# Patient Record
Sex: FEMALE | Race: WHITE | Employment: FULL TIME | URBAN - METROPOLITAN AREA
[De-identification: names, ages, dates, MRNs, and addresses within clinical notes are randomized per-mention and may not be internally consistent; named-entity substitution may affect disease eponyms.]

---

## 2019-09-07 ENCOUNTER — HOSPITAL ENCOUNTER (EMERGENCY)
Age: 34
Discharge: HOME | End: 2019-09-07
Payer: MEDICAID

## 2019-09-07 VITALS
TEMPERATURE: 97.5 F | HEART RATE: 95 BPM | WEIGHT: 135 LBS | OXYGEN SATURATION: 97 % | SYSTOLIC BLOOD PRESSURE: 131 MMHG | DIASTOLIC BLOOD PRESSURE: 87 MMHG | RESPIRATION RATE: 12 BRPM | BODY MASS INDEX: 20.46 KG/M2 | HEIGHT: 68 IN

## 2019-09-07 DIAGNOSIS — M79.89 LEFT LEG SWELLING: Primary | ICD-10-CM

## 2019-09-07 DIAGNOSIS — M25.562 ACUTE PAIN OF LEFT KNEE: ICD-10-CM

## 2019-09-07 DIAGNOSIS — M79.672 LEFT FOOT PAIN: ICD-10-CM

## 2019-09-07 PROCEDURE — 99283 EMERGENCY DEPT VISIT LOW MDM: CPT

## 2019-09-07 RX ORDER — IBUPROFEN 800 MG/1
800 TABLET ORAL EVERY 8 HOURS PRN
Qty: 20 TABLET | Refills: 0 | Status: SHIPPED | OUTPATIENT
Start: 2019-09-07 | End: 2020-06-09

## 2019-09-07 RX ORDER — IBUPROFEN 800 MG/1
800 TABLET ORAL ONCE
Status: DISCONTINUED | OUTPATIENT
Start: 2019-09-07 | End: 2019-09-07

## 2019-09-07 ASSESSMENT — PAIN DESCRIPTION - ORIENTATION: ORIENTATION: LEFT

## 2019-09-07 ASSESSMENT — PAIN DESCRIPTION - PAIN TYPE: TYPE: ACUTE PAIN

## 2019-09-07 ASSESSMENT — PAIN DESCRIPTION - LOCATION: LOCATION: KNEE

## 2019-09-07 ASSESSMENT — PAIN SCALES - GENERAL: PAINLEVEL_OUTOF10: 3

## 2019-09-07 NOTE — ED PROVIDER NOTES
[address]  EMERGENCY DEPARTMENT ENCOUNTER        Pt Name: Malgorzata Leung  MRN: [id number]  [place] 1985  Date of evaluation: 9/7/2019  Provider: OLEKSANDR Quinn  PCP: AMBER Rene - CNP    This patient was not seen and evaluated by the attending physician Johanna See however she was available for consult. CHIEF COMPLAINT       Chief Complaint   Patient presents with    Joint Swelling     left knee swollen started today went to urgent care sent here for ?dvt       HISTORY OF PRESENT ILLNESS   (Location/Symptom, Timing/Onset, Context/Setting, Quality, Duration, Modifying Factors, Severity)  Note limiting factors. Malgorzata Leung is a 37 y.o. female for  Concern for DVT. She was sent here from outside [facility]. A couple of days ago she started with pain at the left great toe- she denies any known LILO. She notes the foot pain has not gotten worse, she notes it is \"almost numb\". Today she woke up with pain and swelling at the left knee and swelling of the entire left leg- the calf and the thigh. She denies any recent injury to the knee or leg or thigh. She denies pain elsewhere. She denies cough or SOB, chest pain, or wheezing. She does smoke tobacco, she denies exogenous hormone use. She denies pain at the calf- just at the knee and the foot. She denies noting any overlying redness or warmth. She notes the pain is worse with weightbearing or movement and is not present at rest.  She has tried ibuprofen without significant relief. Nursing Notes were all reviewed and agreed with or any disagreements were addressed  in the HPI. REVIEW OF SYSTEMS    (2-9 systems for level 4, 10 or more for level 5)     Review of Systems    Positives and Pertinent negatives as per HPI. Except as noted abovein the ROS, all other systems were reviewed and negative.        PAST MEDICAL HISTORY     Past Medical History:   Diagnosis Date    Anxiety     Depression     Hypertension ex partner: None     Emotionally abused: None     Physically abused: None     Forced sexual activity: None   Other Topics Concern    None   Social History Narrative    ** Merged History Encounter **            SCREENINGS             PHYSICAL EXAM    (up to 7 for level 4, 8 or more for level 5)     ED Triage Vitals [09/07/19 1719]   BP Temp Temp Source Pulse Resp SpO2 Height Weight   131/87 97.5 °F (36.4 °C) Temporal 95 12 97 % 5' 8\" (1.727 m) 135 lb (61.2 kg)       Physical Exam  PHYSICAL EXAM  /87   Pulse 95   Temp 97.5 °F (36.4 °C) (Temporal)   Resp 12   Ht 5' 8\" (1.727 m)   Wt 135 lb (61.2 kg)   LMP 09/07/2019   SpO2 97%   BMI 20.53 kg/m²   GENERAL APPEARANCE: Awake and alert. Cooperative. Well-appearing adult female, sitting comfortably in exam chair. She moves all extremities and trunk without assistance or difficulty. She answers questions appropriately she is conversational.  She is nondiaphoretic, she is showing no sign of acute respiratory distress. HEAD: Normocephalic. Atraumatic. EYES: No injection or drainage. ENT: Mucous membranes are moist.   NECK: Supple. Trachea midline  HEART: RRR. No murmurs. LUNGS: Respirations unlabored. CTAB, vesicular sounds throughout. Good air exchange. Speaking comfortably in full sentences. EXTREMITIES: Inspection of the left lower extremity reveals overall good bony alignment, there is no gross deformity. I do not appreciate swelling compared to the contralateral leg. Popliteal pulses difficult to assess, Doppler was obtained, please see nursing note for study value. DP and PT pulses intact and symmetric, 1+. Range of motion at the ankle and EHL strength is intact. She has no reproducible tenderness at the left hallux. No overlying warmth or skin changes. She has no calf tenderness, negative Homans sign. No reproducible knee tenderness to palpation, knee range of motion approximately 0-1 100+ degrees no gross ligamentous instability.   Her 1  Unilateral calf swelling > 3 cm (below tibial tuberosity)  1  Unilateral pitting edema      1  Collateral superficial veins      1  Alternative diagnosis as likely as or more likely than DVT  -2  Total points        -2     DVT = deep venous thrombosis. Risk score interpretation (probability of DVT):  >/=3 points: high risk (75%);  1 to 2 points: moderate risk (17%);  <1 point: low risk (3%). PERC Rule:  Applicable in this patient who has low clinical suspicion for pulmonary embolism. Age < 48years old: Yes  Heart rate < 100 bpm: Yes  Oxygen saturation > 95%: Yes  Hemoptysis: No  Exogenous estrogen use: No  Prior history of DVT or PE: No  Unilateral leg swelling: No  Surgery or significant trauma in the past 4 weeks: No    Based on the above, PE can effectively be ruled out without further testing. Patient is a 57-year-old female who presents via private vehicle from outside urgent care for concern for DVT. On exam patient is alert oriented afebrile well-perfused with normal vital signs for her age. She notes history of tenderness at the left hallux and left knee however no reproducible tenderness is appreciated. I did discuss with the patient the pros and cons of obtaining an x-ray and the likelihood that given her absence of any concerning mechanism of injury it would likely be a fruitless endeavor. She agreed and we decided together to not obtain x-ray imaging. In regards to patient's concern for DVT, I do not appreciate unilateral leg swelling or thigh swelling however patient notes it feels swollen. Her Wells DVT and PERC rules are both negative, however given her concern I will give her an outpatient order for duplex venous ultrasound to be obtained on an outpatient basis. Patient was given signs and symptoms that would warrant immediate return to the ER which include but are not limited to calf redness calf pain hemoptysis, shortness of breath, weakness, fevers.   I instructed

## 2019-09-08 NOTE — ED NOTES
Left popliteal pulse 78 and right 76 with doppler.   Dorsal Pedal pulse 78 right and 80 Left  Posterior Tibial 80 right and 94 Left    Rosario Johns RN  09/07/19 2112       Rosario Johns RN  09/07/19 2124

## 2020-06-09 ENCOUNTER — HOSPITAL ENCOUNTER (EMERGENCY)
Age: 35
Discharge: HOME | End: 2020-06-09
Attending: EMERGENCY MEDICINE
Payer: MEDICAID

## 2020-06-09 ENCOUNTER — APPOINTMENT (OUTPATIENT)
Dept: GENERAL RADIOLOGY | Age: 35
End: 2020-06-09
Payer: MEDICAID

## 2020-06-09 VITALS
SYSTOLIC BLOOD PRESSURE: 156 MMHG | BODY MASS INDEX: 23.49 KG/M2 | WEIGHT: 155 LBS | DIASTOLIC BLOOD PRESSURE: 117 MMHG | OXYGEN SATURATION: 99 % | TEMPERATURE: 98.1 F | RESPIRATION RATE: 14 BRPM | HEART RATE: 97 BPM | HEIGHT: 68 IN

## 2020-06-09 LAB
A/G RATIO: 2 (ref 1.1–2.2)
ALBUMIN SERPL-MCNC: 4.7 G/DL (ref 3.4–5)
ALP BLD-CCNC: 56 U/L (ref 40–129)
ALT SERPL-CCNC: 14 U/L (ref 10–40)
ANION GAP SERPL CALCULATED.3IONS-SCNC: 9 MMOL/L (ref 3–16)
AST SERPL-CCNC: 19 U/L (ref 15–37)
BASOPHILS ABSOLUTE: 0.1 K/UL (ref 0–0.2)
BASOPHILS RELATIVE PERCENT: 1.4 %
BILIRUB SERPL-MCNC: 0.4 MG/DL (ref 0–1)
BUN BLDV-MCNC: 13 MG/DL (ref 7–20)
CALCIUM SERPL-MCNC: 8.8 MG/DL (ref 8.3–10.6)
CHLORIDE BLD-SCNC: 98 MMOL/L (ref 99–110)
CO2: 25 MMOL/L (ref 21–32)
CREAT SERPL-MCNC: <0.5 MG/DL (ref 0.6–1.1)
D DIMER: <200 NG/ML DDU (ref 0–229)
EKG ATRIAL RATE: 95 BPM
EKG DIAGNOSIS: NORMAL
EKG P AXIS: 48 DEGREES
EKG P-R INTERVAL: 158 MS
EKG Q-T INTERVAL: 378 MS
EKG QRS DURATION: 106 MS
EKG QTC CALCULATION (BAZETT): 475 MS
EKG R AXIS: 23 DEGREES
EKG T AXIS: 12 DEGREES
EKG VENTRICULAR RATE: 95 BPM
EOSINOPHILS ABSOLUTE: 0.1 K/UL (ref 0–0.6)
EOSINOPHILS RELATIVE PERCENT: 0.6 %
GFR AFRICAN AMERICAN: >60
GFR NON-AFRICAN AMERICAN: >60
GLOBULIN: 2.4 G/DL
GLUCOSE BLD-MCNC: 100 MG/DL (ref 70–99)
HCG QUALITATIVE: NEGATIVE
HCT VFR BLD CALC: 42.3 % (ref 36–48)
HEMOGLOBIN: 14 G/DL (ref 12–16)
INR BLD: 0.98 (ref 0.86–1.14)
LYMPHOCYTES ABSOLUTE: 2.2 K/UL (ref 1–5.1)
LYMPHOCYTES RELATIVE PERCENT: 21.6 %
MCH RBC QN AUTO: 29.2 PG (ref 26–34)
MCHC RBC AUTO-ENTMCNC: 33.2 G/DL (ref 31–36)
MCV RBC AUTO: 88 FL (ref 80–100)
MONOCYTES ABSOLUTE: 0.7 K/UL (ref 0–1.3)
MONOCYTES RELATIVE PERCENT: 7 %
NEUTROPHILS ABSOLUTE: 6.9 K/UL (ref 1.7–7.7)
NEUTROPHILS RELATIVE PERCENT: 69.4 %
PDW BLD-RTO: 13.5 % (ref 12.4–15.4)
PLATELET # BLD: 360 K/UL (ref 135–450)
PMV BLD AUTO: 7.7 FL (ref 5–10.5)
POTASSIUM REFLEX MAGNESIUM: 4 MMOL/L (ref 3.5–5.1)
PROTHROMBIN TIME: 11.4 SEC (ref 10–13.2)
RBC # BLD: 4.8 M/UL (ref 4–5.2)
SODIUM BLD-SCNC: 132 MMOL/L (ref 136–145)
TOTAL PROTEIN: 7.1 G/DL (ref 6.4–8.2)
TROPONIN: <0.01 NG/ML
WBC # BLD: 10 K/UL (ref 4–11)

## 2020-06-09 PROCEDURE — 85610 PROTHROMBIN TIME: CPT

## 2020-06-09 PROCEDURE — 80053 COMPREHEN METABOLIC PANEL: CPT

## 2020-06-09 PROCEDURE — 71045 X-RAY EXAM CHEST 1 VIEW: CPT

## 2020-06-09 PROCEDURE — 85025 COMPLETE CBC W/AUTO DIFF WBC: CPT

## 2020-06-09 PROCEDURE — 84703 CHORIONIC GONADOTROPIN ASSAY: CPT

## 2020-06-09 PROCEDURE — 84484 ASSAY OF TROPONIN QUANT: CPT

## 2020-06-09 PROCEDURE — 93005 ELECTROCARDIOGRAM TRACING: CPT | Performed by: EMERGENCY MEDICINE

## 2020-06-09 PROCEDURE — 6360000002 HC RX W HCPCS: Performed by: EMERGENCY MEDICINE

## 2020-06-09 PROCEDURE — 93010 ELECTROCARDIOGRAM REPORT: CPT | Performed by: INTERNAL MEDICINE

## 2020-06-09 PROCEDURE — 96374 THER/PROPH/DIAG INJ IV PUSH: CPT

## 2020-06-09 PROCEDURE — 99285 EMERGENCY DEPT VISIT HI MDM: CPT

## 2020-06-09 PROCEDURE — 85379 FIBRIN DEGRADATION QUANT: CPT

## 2020-06-09 RX ORDER — LORAZEPAM 2 MG/ML
1 INJECTION INTRAMUSCULAR ONCE
Status: COMPLETED | OUTPATIENT
Start: 2020-06-09 | End: 2020-06-09

## 2020-06-09 RX ORDER — AMITRIPTYLINE HYDROCHLORIDE 25 MG/1
25 TABLET, FILM COATED ORAL NIGHTLY
COMMUNITY
End: 2020-07-03

## 2020-06-09 RX ORDER — LORAZEPAM 2 MG/ML
INJECTION INTRAMUSCULAR
Status: DISCONTINUED
Start: 2020-06-09 | End: 2020-06-09 | Stop reason: HOSPADM

## 2020-06-09 RX ORDER — CITALOPRAM 40 MG/1
40 TABLET ORAL DAILY
COMMUNITY

## 2020-06-09 RX ORDER — ONDANSETRON 4 MG/1
4 TABLET, FILM COATED ORAL EVERY 8 HOURS PRN
COMMUNITY

## 2020-06-09 RX ADMIN — LORAZEPAM 1 MG: 2 INJECTION INTRAMUSCULAR; INTRAVENOUS at 16:59

## 2020-06-09 NOTE — LETTER
[facility] [place] [facility]  [address] [address]  Phone: [phone]               June 9, 2020    Patient: Meseret Ramirez   YOB: 1985   Date of Visit: 6/9/2020       To Whom It May Concern:    Td Phillips was seen and treated in our emergency department on 6/9/2020. She may return to work on Effie 10, 2020.       Sincerely,       Evelina Vyas RN         Signature:__________________________________

## 2020-06-09 NOTE — ED PROVIDER NOTES
[facility]  eMERGENCY dEPARTMENT eNCOUnter      PtName: Rhianna Courtney  MRN: [id number]  [place] 1985  Date of evaluation: 6/9/2020  Provider: Haylee Canales DO     CHIEF COMPLAINT       Chief Complaint   Patient presents with    Chest Pain     Pt states that her heart was racing, had chest pain, has had trouble breathing. HISTORY OF PRESENT ILLNESS   (Location/Symptom, Timing/Onset,Context/Setting, Quality, Duration, Modifying Factors, Severity) Note limiting factors. HPI    Rhianna Courtney is a 38 y.o. female who presents to the emergency department with chief complaint of chest pain that started at 7 AM this morning. Intermittent, sharp only lasting a second left-sided. No alleviating or aggravating factors. No family history of ACS. History of anxiety. Reported history of hypertension but has never been placed on medication for it. Pain is not currently occurring but when it was occurring it was 4/10. No radiation. Denies past history of DVT or PE, denies hormone therapy use, denies recent surgery, denies recent travel. No calf pain or lower extremity swelling. Nursing Notes were reviewed. REVIEW OF SYSTEMS    (2+ forlevel 4; 10+ for level 5)     Review of Systems  See hpi for further details. Complete 10 point review of all systems performed and is otherwise negative unless noted above.     PAST MEDICAL HISTORY     Past Medical History:   Diagnosis Date    Anxiety     Depression     Hypertension        SURGICAL HISTORY       Past Surgical History:   Procedure Laterality Date    TUBAL LIGATION  2019       CURRENT MEDICATIONS       Discharge Medication List as of 6/9/2020  6:02 PM      CONTINUE these medications which have NOT CHANGED    Details   amitriptyline (ELAVIL) 25 MG tablet Take 25 mg by mouth nightlyHistorical Med      ondansetron (ZOFRAN) 4 MG tablet Take 4 mg by mouth every 8 hours as needed for Nausea or VomitingHistorical Med      citalopram (CELEXA) 40 MG tablet Take 40 mg by mouth dailyHistorical Med      hydrOXYzine (ATARAX) 25 MG tablet Take 25 mg by mouth 3 times daily as needed for Itching             ALLERGIES     Patient has no known allergies. FAMILY HISTORY     History reviewed. No pertinent family history. SOCIAL HISTORY       Social History     Socioeconomic History    Marital status: Single     Spouse name: None    Number of children: None    Years of education: None    Highest education level: None   Occupational History    None   Social Needs    Financial resource strain: None    Food insecurity     Worry: None     Inability: None    Transportation needs     Medical: None     Non-medical: None   Tobacco Use    Smoking status: Current Every Day Smoker     Packs/day: 0.50    Smokeless tobacco: Never Used   Substance and Sexual Activity    Alcohol use:  Yes     Alcohol/week: 5.0 standard drinks     Types: 6 Standard drinks or equivalent per week     Comment: social    Drug use: No    Sexual activity: Yes     Partners: Male   Lifestyle    Physical activity     Days per week: None     Minutes per session: None    Stress: None   Relationships    Social connections     Talks on phone: None     Gets together: None     Attends Taoism service: None     Active member of club or organization: None     Attends meetings of clubs or organizations: None     Relationship status: None    Intimate partner violence     Fear of current or ex partner: None     Emotionally abused: None     Physically abused: None     Forced sexual activity: None   Other Topics Concern    None   Social History Narrative    ** Merged History Encounter **            SCREENINGS    Darby Coma Scale  Eye Opening: Spontaneous  Best Verbal Response: Oriented  Best Motor Response: Obeys commands  Darby Coma Scale Score: 15      PHYSICAL EXAM    (5+ for level 4, 8+ for level 5)     ED Triage Vitals [06/09/20 1559]   BP Temp Temp Source Pulse Resp SpO2 Height Weight Micah Blackburn ) 179/121 98.1 °F (36.7 °C) Oral 95 18 99 % 5' 8\" (1.727 m) 155 lb (70.3 kg)       Physical Exam  Vitals signs and nursing note reviewed. Constitutional:       General: She is not in acute distress. Appearance: Normal appearance. She is not toxic-appearing or diaphoretic. HENT:      Head: Normocephalic and atraumatic. Right Ear: External ear normal.      Left Ear: External ear normal.      Nose: Nose normal.      Mouth/Throat:      Mouth: Mucous membranes are moist.      Pharynx: Oropharynx is clear. Eyes:      General: No scleral icterus. Right eye: No discharge. Left eye: No discharge. Extraocular Movements: Extraocular movements intact. Conjunctiva/sclera: Conjunctivae normal.      Pupils: Pupils are equal, round, and reactive to light. Neck:      Musculoskeletal: Normal range of motion and neck supple. Cardiovascular:      Rate and Rhythm: Normal rate and regular rhythm. Pulses: Normal pulses. Heart sounds: Normal heart sounds. No murmur. No friction rub. No gallop. Pulmonary:      Effort: Pulmonary effort is normal. No respiratory distress. Breath sounds: Normal breath sounds. No stridor. No wheezing, rhonchi or rales. Chest:      Chest wall: No tenderness. Abdominal:      General: Abdomen is flat. There is no distension. Tenderness: There is no abdominal tenderness. Musculoskeletal: Normal range of motion. General: No swelling, tenderness or deformity. Right lower leg: No edema. Left lower leg: No edema. Skin:     General: Skin is warm and dry. Capillary Refill: Capillary refill takes less than 2 seconds. Coloration: Skin is not jaundiced or pale. Findings: No bruising, erythema or rash. Neurological:      General: No focal deficit present. Mental Status: She is alert and oriented to person, place, and time. Cranial Nerves: No cranial nerve deficit. Sensory: No sensory deficit. Motor: No weakness. Psychiatric:         Mood and Affect: Mood normal.         Behavior: Behavior normal.         Thought Content: Thought content normal.         Judgment: Judgment normal.         DIAGNOSTIC RESULTS     EKG (Per Emergency Physician):   EKG interpretation by ED physician: Normal axis, sinus rhythm at 95 bpm.  Slight T wave flattening anteriorly and laterally. T waves are less prominent than previous EKG from 2/16/2011. RADIOLOGY (Per Emergency Physician): Interpretation per the Radiologist below, if available at the time of this note:  Xr Chest Portable    Result Date: 6/9/2020  EXAMINATION: ONE XRAY VIEW OF THE CHEST 6/9/2020 4:50 pm COMPARISON: 02/16/2011 radiograph HISTORY: ORDERING SYSTEM PROVIDED HISTORY: cp TECHNOLOGIST PROVIDED HISTORY: Reason for exam:->cp Reason for Exam: Chest pain, (Pt states that her heart was racing, had chest pain, has had trouble breathing.) Acuity: Acute Type of Exam: Initial FINDINGS: The heart, mediastinum and pulmonary vascularity are normal.  Lungs are well-expanded and clear.  No skeletal abnormalities are present in the chest.     No significant findings in the chest.       LABS:  Labs Reviewed   COMPREHENSIVE METABOLIC PANEL W/ REFLEX TO MG FOR LOW K - Abnormal; Notable for the following components:       Result Value    Sodium 132 (*)     Chloride 98 (*)     Glucose 100 (*)     CREATININE <0.5 (*)     All other components within normal limits    Narrative:     Performed at:  [address] [address],  [place], [facility]   Phone [phone]   CBC WITH AUTO DIFFERENTIAL    Narrative:     Performed at:  [address] [address],  [place], [facility]   Phone [phone]   TROPONIN    Narrative:     Performed at:  [address] [address],  [place], [facility]   Phone [phone]   PROTIME-INR    Narrative:     Performed at:  [address] [address] [facility]

## 2020-07-03 ENCOUNTER — APPOINTMENT (OUTPATIENT)
Dept: CT IMAGING | Age: 35
End: 2020-07-03
Payer: MEDICAID

## 2020-07-03 ENCOUNTER — HOSPITAL ENCOUNTER (EMERGENCY)
Age: 35
Discharge: HOME | End: 2020-07-03
Payer: MEDICAID

## 2020-07-03 VITALS
SYSTOLIC BLOOD PRESSURE: 146 MMHG | HEART RATE: 88 BPM | OXYGEN SATURATION: 100 % | RESPIRATION RATE: 16 BRPM | TEMPERATURE: 97.4 F | DIASTOLIC BLOOD PRESSURE: 100 MMHG | HEIGHT: 68 IN | BODY MASS INDEX: 22.73 KG/M2 | WEIGHT: 150 LBS

## 2020-07-03 PROCEDURE — 70450 CT HEAD/BRAIN W/O DYE: CPT

## 2020-07-03 PROCEDURE — 99284 EMERGENCY DEPT VISIT MOD MDM: CPT

## 2020-07-03 PROCEDURE — 6370000000 HC RX 637 (ALT 250 FOR IP): Performed by: PHYSICIAN ASSISTANT

## 2020-07-03 RX ORDER — CYCLOBENZAPRINE HCL 10 MG
10 TABLET ORAL 3 TIMES DAILY PRN
Qty: 20 TABLET | Refills: 0 | Status: SHIPPED | OUTPATIENT
Start: 2020-07-03 | End: 2020-07-10

## 2020-07-03 RX ORDER — ACETAMINOPHEN 500 MG
1000 TABLET ORAL ONCE
Status: COMPLETED | OUTPATIENT
Start: 2020-07-03 | End: 2020-07-03

## 2020-07-03 RX ORDER — MELOXICAM 15 MG/1
15 TABLET ORAL DAILY
COMMUNITY

## 2020-07-03 RX ADMIN — ACETAMINOPHEN 1000 MG: 500 TABLET ORAL at 14:59

## 2020-07-03 ASSESSMENT — PAIN SCALES - GENERAL: PAINLEVEL_OUTOF10: 6

## 2020-07-03 ASSESSMENT — PAIN DESCRIPTION - LOCATION: LOCATION: HEAD

## 2020-07-03 ASSESSMENT — PAIN DESCRIPTION - PAIN TYPE: TYPE: ACUTE PAIN

## 2020-07-03 ASSESSMENT — PAIN DESCRIPTION - INTENSITY: RATING_2: 6

## 2020-07-04 ASSESSMENT — ENCOUNTER SYMPTOMS
CONSTIPATION: 0
DIARRHEA: 0
NAUSEA: 1
RHINORRHEA: 0
BACK PAIN: 0
VOMITING: 1
ABDOMINAL PAIN: 0
EYE PAIN: 0
COUGH: 0
SORE THROAT: 0
SHORTNESS OF BREATH: 0

## 2020-07-04 NOTE — ED PROVIDER NOTES
[address] ED  EMERGENCY DEPARTMENT ENCOUNTER        Pt Name: Esme Carcamo  MRN: [id number]  [place] 1985  Date of evaluation: 7/3/2020  Provider: Biju Begum PA-C  PCP: Colletta Re, APRN - CNP    Evaluation by [facility]B. My supervising physician was available for consultation. CHIEF COMPLAINT       Chief Complaint   Patient presents with    Fall     fell down six stairs yesterday at work, hit head on railing, twisted left knee    Head Injury       HISTORY OF PRESENT ILLNESS   (Location/Symptom, Timing/Onset, Context/Setting, Quality, Duration, Modifying Factors, Severity)  Note limiting factors. Esme Carcamo is a 38 y.o. female who presents here to the emergency department stating that she was leaving work yesterday, she first twisted her right ankle then twisted her left ankle hit her left knee, fell backwards and hit her head on a handrail. She states that she had some immediate pain in her head and a little bit in her neck however this morning she woke up with a severe headache, some neck pain, and some nausea and some vomiting. She took her Zofran which seemed to help, but because the pain continued she decided to come to the emergency department for an evaluation. She says that she is not having any associated lightheadedness or dizziness, no loss of consciousness, rates her pain level is 6/10. Nursing Notes were all reviewed and agreed with or any disagreements were addressed  in the HPI. REVIEW OF SYSTEMS    (2-9 systems for level 4, 10 or more for level 5)     Review of Systems   Constitutional: Negative for chills, diaphoresis and fever. HENT: Negative for congestion, ear pain, rhinorrhea and sore throat. Eyes: Negative for pain and visual disturbance. Respiratory: Negative for cough and shortness of breath. Cardiovascular: Negative for chest pain, palpitations and leg swelling. Gastrointestinal: Positive for nausea and vomiting. Negative for abdominal pain, constipation and diarrhea. Genitourinary: Negative for decreased urine volume, dysuria, frequency and urgency. Musculoskeletal: Negative for back pain and neck pain. Skin: Negative for rash and wound. Neurological: Positive for headaches. Negative for dizziness and light-headedness. PAST MEDICAL HISTORY     Past Medical History:   Diagnosis Date    Anxiety     Depression     Hypertension        SURGICAL HISTORY     Past Surgical History:   Procedure Laterality Date    TUBAL LIGATION  2019       [address]       Discharge Medication List as of 7/3/2020  3:55 PM      CONTINUE these medications which have NOT CHANGED    Details   meloxicam (MOBIC) 15 MG tablet Take 15 mg by mouth dailyHistorical Med      ondansetron (ZOFRAN) 4 MG tablet Take 4 mg by mouth every 8 hours as needed for Nausea or VomitingHistorical Med      citalopram (CELEXA) 40 MG tablet Take 40 mg by mouth dailyHistorical Med      hydrOXYzine (ATARAX) 25 MG tablet Take 25 mg by mouth 3 times daily as needed for Itching             ALLERGIES     Patient has no known allergies. FAMILYHISTORY     History reviewed. No pertinent family history. SOCIAL HISTORY       Social History     Socioeconomic History    Marital status: Single     Spouse name: None    Number of children: None    Years of education: None    Highest education level: None   Occupational History    None   Social Needs    Financial resource strain: None    Food insecurity     Worry: None     Inability: None    Transportation needs     Medical: None     Non-medical: None   Tobacco Use    Smoking status: Current Every Day Smoker     Packs/day: 0.50    Smokeless tobacco: Never Used   Substance and Sexual Activity    Alcohol use:  Yes     Alcohol/week: 5.0 standard drinks     Types: 6 Standard drinks or equivalent per week     Comment: social    Drug use: No    Sexual activity: Yes     Partners: Male   Lifestyle    Physical activity     Days per week: None     Minutes per session: None    Stress: None   Relationships    Social connections     Talks on phone: None     Gets together: None     Attends Rastafarian service: None     Active member of club or organization: None     Attends meetings of clubs or organizations: None     Relationship status: None    Intimate partner violence     Fear of current or ex partner: None     Emotionally abused: None     Physically abused: None     Forced sexual activity: None   Other Topics Concern    None   Social History Narrative    ** Merged History Encounter **            SCREENINGS             PHYSICAL EXAM    (up to 7 for level 4, 8 or more for level 5)     ED Triage Vitals [07/03/20 1312]   BP Temp Temp Source Pulse Resp SpO2 Height Weight   (!) 146/100 97.4 °F (36.3 °C) Oral 88 16 100 % 5' 8\" (1.727 m) 150 lb (68 kg)       Physical Exam  Vitals signs and nursing note reviewed. Constitutional:       Appearance: She is well-developed. She is not diaphoretic. HENT:      Head: Normocephalic and atraumatic. Right Ear: External ear normal.      Left Ear: External ear normal.      Nose: Nose normal.   Eyes:      General: Lids are normal.         Right eye: No discharge. Left eye: No discharge. Extraocular Movements: Extraocular movements intact. Conjunctiva/sclera: Conjunctivae normal.      Pupils: Pupils are equal, round, and reactive to light. Neck:      Musculoskeletal: Normal range of motion and neck supple. Normal range of motion. Muscular tenderness present. No neck rigidity, crepitus, injury, pain with movement or spinous process tenderness. Cardiovascular:      Rate and Rhythm: Normal rate and regular rhythm. Heart sounds: Normal heart sounds. No murmur. No friction rub. No gallop. Pulmonary:      Effort: Pulmonary effort is normal. No respiratory distress. Breath sounds: Normal breath sounds. No stridor. No wheezing or rales.    Chest: Chest wall: No tenderness. Musculoskeletal:      Left knee: She exhibits decreased range of motion. She exhibits no swelling and no effusion. Tenderness found. Medial joint line and lateral joint line tenderness noted. Skin:     General: Skin is warm and dry. Coloration: Skin is not pale. Neurological:      Mental Status: She is alert and oriented to person, place, and time. She is not disoriented. GCS: GCS eye subscore is 4. GCS verbal subscore is 5. GCS motor subscore is 6. Cranial Nerves: No cranial nerve deficit. Sensory: No sensory deficit. Motor: No tremor or abnormal muscle tone. Coordination: Coordination normal.      Deep Tendon Reflexes: Babinski sign absent on the right side. Babinski sign absent on the left side. Reflex Scores:       Bicep reflexes are 2+ on the right side and 2+ on the left side. Brachioradialis reflexes are 2+ on the right side and 2+ on the left side. Patellar reflexes are 2+ on the right side and 2+ on the left side. Comments: No Dysdiadochokinesia, crossover thumb to contralateral ear normal, no pronator drift. Psychiatric:         Behavior: Behavior normal.         DIAGNOSTIC RESULTS   LABS:    Labs Reviewed - No data to display    All other labs were within normal range or not returned as of this dictation. EKG: All EKG's are interpreted by the Emergency Department Physician who either signs orCo-signs this chart in the absence of a cardiologist.  Please see their note for interpretation of EKG. RADIOLOGY:   Non-plain film images such as CT, Ultrasound and MRI are read by the radiologist. Carrie Silva radiographic images are visualized andpreliminarily interpreted by the  ED Provider with the below findings:        Interpretation [facility] Radiologist below, if available at the time of this note:    CT Head WO Contrast   Final Result   No acute intracranial abnormality.            Ct Head Wo Contrast    Result Date: 7/3/2020  EXAMINATION: CT OF THE HEAD WITHOUT CONTRAST  7/3/2020 3:01 pm TECHNIQUE: CT of the head was performed without the administration of intravenous contrast. Dose modulation, iterative reconstruction, and/or weight based adjustment of the mA/kV was utilized to reduce the radiation dose to as low as reasonably achievable. COMPARISON: 05/04/2014 HISTORY: ORDERING SYSTEM PROVIDED HISTORY: injury yesterday TECHNOLOGIST PROVIDED HISTORY: If patient is on cardiac monitor and/or pulse ox, they may be taken off cardiac monitor and pulse ox, left on O2 if currently on. All monitors reattached when patient returns to room. Has a \"code stroke\" or \"stroke alert\" been called? ->No Reason for exam:->injury yesterday Is the patient pregnant?->No Reason for Exam: Pt states she fell down and hit her head yesterday Acuity: Acute Type of Exam: Initial FINDINGS: BRAIN/VENTRICLES: There is no acute intracranial hemorrhage, mass effect or midline shift. No abnormal extra-axial fluid collection. The gray-white differentiation is maintained without evidence of an acute infarct. There is no evidence of hydrocephalus. ORBITS: The visualized portion of the orbits demonstrate no acute abnormality. SINUSES: The visualized paranasal sinuses and mastoid air cells demonstrate no acute abnormality. SOFT TISSUES/SKULL:  No acute abnormality of the visualized skull or soft tissues. No acute intracranial abnormality. PROCEDURES   Unless otherwise noted below, none     Procedures   Hinged knee brace splint was placed by the emergency department technician, it was applied appropriately and the patient was neurovascularly intact as observed by myself.         CRITICAL CARE TIME   N/A    CONSULTS:  None      EMERGENCY DEPARTMENT COURSE and DIFFERENTIAL DIAGNOSIS/MDM:   Vitals:    Vitals:    07/03/20 1312   BP: (!) 146/100   Pulse: 88   Resp: 16   Temp: 97.4 °F (36.3 °C)   TempSrc: Oral   SpO2: 100%   Weight: 150 lb (68 kg)   Height: 5' 8\" (1.727 m)       Patient was given thefollowing medications:  Medications   acetaminophen (TYLENOL) tablet 1,000 mg (1,000 mg Oral Given 7/3/20 7074)           Differential diagnosis includes intracranial or subdural hemorrhage, nothing to suggest that based on CT scan, patient had no bony tenderness and axial loading was negative, less likely to be an occult fracture of her neck. Patient declined x-rays for her bilateral ankles, and her knee stating that most likely not fractured although we did give her a knee brace. FINAL IMPRESSION      1. Injury of head, initial encounter    2. Cervical strain, acute, initial encounter    3.  Knee strain, left, initial encounter          DISPOSITION/PLAN   DISPOSITION Decision To Discharge 07/03/2020 03:49:44 PM      PATIENT REFERREDTO:  AMBER Miles - CNP  [address] [place] [address]  [phone]    Call in 2 days  For a recheck in 2-7 days    [facility], [address] [place] [address] [address]  [phone]    Call today  to be seen by this MD or one of their partners, For a recheck in 3-7 days      DISCHARGE MEDICATIONS:  Discharge Medication List as of 7/3/2020  3:55 PM      START taking these medications    Details   cyclobenzaprine (FLEXERIL) 10 MG tablet Take 1 tablet by mouth 3 times daily as needed for Muscle spasms, Disp-20 tablet, R-0Print             DISCONTINUED MEDICATIONS:  Discharge Medication List as of 7/3/2020  3:55 PM      STOP taking these medications       amitriptyline (ELAVIL) 25 MG tablet Comments:   Reason for Stopping:                      (Please note that portions ofthis note were completed with a voice recognition program.  Efforts were made to edit the dictations but occasionally words are mis-transcribed.)    Logan Ramos PA-C (electronically signed)             Logan Ramos PA-C  07/04/20 1544

## 2021-03-29 ENCOUNTER — APPOINTMENT (OUTPATIENT)
Dept: GENERAL RADIOLOGY | Age: 36
End: 2021-03-29
Payer: MEDICAID

## 2021-03-29 ENCOUNTER — HOSPITAL ENCOUNTER (EMERGENCY)
Age: 36
Discharge: HOME | End: 2021-03-29
Payer: MEDICAID

## 2021-03-29 VITALS
HEART RATE: 88 BPM | BODY MASS INDEX: 24.25 KG/M2 | SYSTOLIC BLOOD PRESSURE: 148 MMHG | RESPIRATION RATE: 18 BRPM | OXYGEN SATURATION: 97 % | TEMPERATURE: 98 F | DIASTOLIC BLOOD PRESSURE: 106 MMHG | HEIGHT: 68 IN | WEIGHT: 160 LBS

## 2021-03-29 DIAGNOSIS — R03.0 ELEVATED BLOOD PRESSURE READING: ICD-10-CM

## 2021-03-29 DIAGNOSIS — M79.671 ACUTE FOOT PAIN, RIGHT: Primary | ICD-10-CM

## 2021-03-29 DIAGNOSIS — S93.601A SPRAIN OF RIGHT FOOT, INITIAL ENCOUNTER: ICD-10-CM

## 2021-03-29 PROCEDURE — 73630 X-RAY EXAM OF FOOT: CPT

## 2021-03-29 PROCEDURE — 6370000000 HC RX 637 (ALT 250 FOR IP): Performed by: PHYSICIAN ASSISTANT

## 2021-03-29 PROCEDURE — 99284 EMERGENCY DEPT VISIT MOD MDM: CPT

## 2021-03-29 RX ORDER — ACETAMINOPHEN 325 MG/1
650 TABLET ORAL ONCE
Status: COMPLETED | OUTPATIENT
Start: 2021-03-29 | End: 2021-03-29

## 2021-03-29 RX ORDER — NAPROXEN 500 MG/1
500 TABLET ORAL 2 TIMES DAILY WITH MEALS
Qty: 20 TABLET | Refills: 0 | Status: SHIPPED | OUTPATIENT
Start: 2021-03-29 | End: 2021-04-08

## 2021-03-29 RX ORDER — IBUPROFEN 600 MG/1
600 TABLET ORAL ONCE
Status: COMPLETED | OUTPATIENT
Start: 2021-03-29 | End: 2021-03-29

## 2021-03-29 RX ORDER — HYDROCODONE BITARTRATE AND ACETAMINOPHEN 5; 325 MG/1; MG/1
1 TABLET ORAL ONCE
Status: COMPLETED | OUTPATIENT
Start: 2021-03-29 | End: 2021-03-29

## 2021-03-29 RX ORDER — ONDANSETRON 4 MG/1
4 TABLET, ORALLY DISINTEGRATING ORAL ONCE
Status: COMPLETED | OUTPATIENT
Start: 2021-03-29 | End: 2021-03-29

## 2021-03-29 RX ADMIN — ONDANSETRON 4 MG: 4 TABLET, ORALLY DISINTEGRATING ORAL at 19:16

## 2021-03-29 RX ADMIN — ACETAMINOPHEN 650 MG: 325 TABLET ORAL at 19:16

## 2021-03-29 RX ADMIN — HYDROCODONE BITARTRATE AND ACETAMINOPHEN 1 TABLET: 5; 325 TABLET ORAL at 20:21

## 2021-03-29 RX ADMIN — IBUPROFEN 600 MG: 600 TABLET, FILM COATED ORAL at 19:16

## 2021-03-29 ASSESSMENT — PAIN SCALES - GENERAL
PAINLEVEL_OUTOF10: 6
PAINLEVEL_OUTOF10: 8
PAINLEVEL_OUTOF10: 7

## 2021-03-29 ASSESSMENT — PAIN DESCRIPTION - PAIN TYPE: TYPE: ACUTE PAIN

## 2021-03-29 ASSESSMENT — PAIN DESCRIPTION - LOCATION: LOCATION: TOE (COMMENT WHICH ONE)

## 2021-03-29 ASSESSMENT — ENCOUNTER SYMPTOMS: NAUSEA: 1

## 2021-03-29 NOTE — LETTER
[facility] [place] [facility]  [address] [address]  Phone: [phone]               March 29, 2021    Patient: Hugo Rudd   YOB: 1985   Date of Visit: 3/29/2021       To Whom It May Concern:    Milena Baltazar was seen and treated in our emergency department on 3/29/2021. Please excuse from work 3/29-3/31.       Sincerely,       Farhat Wilson PA-C         Signature:__________________________________

## 2021-03-29 NOTE — ED PROVIDER NOTES
[address] ED  EMERGENCY DEPARTMENT ENCOUNTER        Pt Name: Chuckie Mccauley  MRN: [id number]  [place] 1985  Date of evaluation: 3/29/2021  Provider: Kong Bowden PA-C  PCP: AMBER Richardson CNP    Shared Visit or Autonomous Visit: HERB. I have evaluated this patient. My supervising physician was available for consultation. CHIEF COMPLAINT       Chief Complaint   Patient presents with    Toe Pain     right foot middle toes pain. HISTORY OF PRESENT ILLNESS   (Location/Symptom, Timing/Onset, Context/Setting, Quality, Duration, Modifying Factors, Severity)  Note limiting factors. Chuckie Mccauley is a 43 y.o. female presenting to the emergency department for evaluation of right foot pain and swelling. Symptoms started yesterday during her shift at work. 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 States sometimes she runs into her feet with the [employer] lift. She does not recall any specific injury but started having pain in her foot yesterday has worsened and today painful to put pressure on her foot with walking and having swelling pain is mainly at the second and third proximal toes and into her foot. No history of gout. States the pain is making her nauseous. Has not taken anything for pain today yet. The history is provided by the patient. Foot Problem  Location:  Foot  Time since incident:  1 day  Foot location:  R foot  Pain details:     Quality:  Throbbing    Onset quality:  Gradual    Duration:  2 days    Timing:  Constant    Progression:  Worsening  Chronicity:  New  Worsened by:  Bearing weight, activity, flexion and extension  Associated symptoms: no fever and no numbness          Nursing Notes were reviewed    REVIEW OF SYSTEMS    (2-9 systems for level 4, 10 or more for level 5)     Review of Systems   Constitutional: Negative for fever. Gastrointestinal: Positive for nausea. Musculoskeletal:        Rt foot pain   Skin: Negative for wound. Neurological: Negative for numbness. Positives and Pertinent negatives as per HPI. PAST MEDICAL HISTORY     Past Medical History:   Diagnosis Date    Anxiety     Depression     Hypertension          SURGICAL HISTORY     Past Surgical History:   Procedure Laterality Date    TUBAL LIGATION  2019         [address]       Discharge Medication List as of 3/29/2021  8:18 PM      CONTINUE these medications which have NOT CHANGED    Details   meloxicam (MOBIC) 15 MG tablet Take 15 mg by mouth dailyHistorical Med      ondansetron (ZOFRAN) 4 MG tablet Take 4 mg by mouth every 8 hours as needed for Nausea or VomitingHistorical Med      citalopram (CELEXA) 40 MG tablet Take 40 mg by mouth dailyHistorical Med      hydrOXYzine (ATARAX) 25 MG tablet Take 25 mg by mouth 3 times daily as needed for Itching               ALLERGIES     Patient has no known allergies. FAMILYHISTORY     History reviewed. No pertinent family history. SOCIAL HISTORY       Social History     Socioeconomic History    Marital status: Single     Spouse name: None    Number of children: None    Years of education: None    Highest education level: None   Occupational History    None   Social Needs    Financial resource strain: None    Food insecurity     Worry: None     Inability: None    Transportation needs     Medical: None     Non-medical: None   Tobacco Use    Smoking status: Current Every Day Smoker     Packs/day: 0.50    Smokeless tobacco: Never Used   Substance and Sexual Activity    Alcohol use:  Yes     Alcohol/week: 5.0 standard drinks     Types: 6 Standard drinks or equivalent per week     Comment: social    Drug use: No    Sexual activity: Yes     Partners: Male   Lifestyle    Physical activity     Days per week: None     Minutes per session: None    Stress: None   Relationships    Social connections     Talks on phone: None     Gets Non-plain film images such as CT, Ultrasound and MRI are read by the radiologist. Plain radiographic images are visualized andpreliminarily interpreted by the  ED Provider with the below findings:        Interpretation perthe Radiologist below, if available at the time of this note:    XR FOOT RIGHT (MIN 3 VIEWS)   Final Result   No acute findings           Xr Foot Right (min 3 Views)    Result Date: 3/29/2021  EXAMINATION: 3 XRAY VIEWS OF THE RIGHT FOOT 3/29/2021 7:22 pm COMPARISON: 09/12/2016 HISTORY: ORDERING SYSTEM PROVIDED HISTORY: pain and swelling in area 2nd and 3rd proximal toes r/o fx TECHNOLOGIST PROVIDED HISTORY: Reason for exam:->pain and swelling in area 2nd and 3rd proximal toes r/o fx Reason for Exam: pain and swelling in area 2nd and 3rd proximal toes r/o fx Acuity: Acute Type of Exam: Initial FINDINGS: No fracture or dislocation. No erosive changes. No significant joint space narrowing. No acute findings       PROCEDURES   Unless otherwise noted below, none     Procedures    CRITICAL CARE TIME   N/A    CONSULTS:  None      EMERGENCY DEPARTMENT COURSE and DIFFERENTIAL DIAGNOSIS/MDM:   Vitals:    Vitals:    03/29/21 1838   BP: (!) 148/106   Pulse: 88   Resp: 18   Temp: 98 °F (36.7 °C)   TempSrc: Oral   SpO2: 97%   Weight: 160 lb (72.6 kg)   Height: 5' 8\" (1.727 m)       Patient was given thefollowing medications:  Medications   ibuprofen (ADVIL;MOTRIN) tablet 600 mg (600 mg Oral Given 3/29/21 1916)   acetaminophen (TYLENOL) tablet 650 mg (650 mg Oral Given 3/29/21 1916)   ondansetron (ZOFRAN-ODT) disintegrating tablet 4 mg (4 mg Oral Given 3/29/21 1916)   HYDROcodone-acetaminophen (NORCO) 5-325 MG per tablet 1 tablet (1 tablet Oral Given 3/29/21 2021)       8:16 PM EDT  X-ray obtained. Negative for fracture. No dislocation. No bony erosions. Suspect sprain. She has mild bruising at the site along with swelling. No erythema do not suspect any infection.   We did also discuss possibility of gout. Recommend treatment with anti-inflammatories, ice rest elevation, postop shoe and crutches will be given. Advise close follow-up. Orthopedic referral provided. Advise return to the ER for any worsening or changes. She understands and agrees. I estimate there is LOW risk for FRACTURE, COMPARTMENT SYNDROME, DEEP VENOUS THROMBOSIS, SEPTIC ARTHRITIS, TENDON OR NEUROVASCULAR INJURY, thus I consider the discharge disposition reasonable. FINAL IMPRESSION      1. Acute foot pain, right    2. Sprain of right foot, initial encounter    3.  Elevated blood pressure reading          DISPOSITION/PLAN   DISPOSITION     PATIENT REFERREDTO:  Lakshmi Charlie, APRN - CNP  [address] [facility]  [phone]    In 3 days      Valentine Mancuso Professor Romeo Ferrari [facility] [address] orthopedics as needed    [facility]  [address] [facility]  [phone]    If symptoms worsen      DISCHARGE MEDICATIONS:  Discharge Medication List as of 3/29/2021  8:18 PM      START taking these medications    Details   naproxen (NAPROSYN) 500 MG tablet Take 1 tablet by mouth 2 times daily (with meals) for 10 days, Disp-20 tablet, R-0Print             DISCONTINUED MEDICATIONS:  Discharge Medication List as of 3/29/2021  8:18 PM                 (Please note that portions ofthis note were completed with a voice recognition program.  Efforts were made to edit the dictations but occasionally words are mis-transcribed.)    Coty Dunham PA-C (electronically signed)              Khadijah Contreras PA-C  03/30/21 2775